# Patient Record
Sex: MALE | Race: WHITE | NOT HISPANIC OR LATINO
[De-identification: names, ages, dates, MRNs, and addresses within clinical notes are randomized per-mention and may not be internally consistent; named-entity substitution may affect disease eponyms.]

---
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
## 2022-07-26 ENCOUNTER — TELEPHONE (OUTPATIENT)
Dept: FAMILY MEDICINE | Facility: CLINIC | Age: 22
End: 2022-07-26

## 2022-07-26 NOTE — TELEPHONE ENCOUNTER
"Pt calling to reschedule appointment.   Notes to cancelled appointment states: \"lm to return call and reschedule appointment-provider has an emergency sugery\"  Got pt rescheduled August 16th but would like to be seen sooner if possible.   "

## 2022-07-27 NOTE — TELEPHONE ENCOUNTER
I have attempted to call the pt with the following message. I left a message for pt to call back. I will call back another time. Haylie Fiore, CMA

## 2022-07-27 NOTE — TELEPHONE ENCOUNTER
Please inform him that my schedule is full before the date of his appointment-he will have to wait for that date.  If he has concerns before that time he should consider urgent care. JAYA Pennington MD

## 2022-08-16 ENCOUNTER — OFFICE VISIT (OUTPATIENT)
Dept: FAMILY MEDICINE | Facility: CLINIC | Age: 22
End: 2022-08-16
Payer: COMMERCIAL

## 2022-08-16 DIAGNOSIS — Z11.4 SCREENING FOR HIV (HUMAN IMMUNODEFICIENCY VIRUS): ICD-10-CM

## 2022-08-16 DIAGNOSIS — Z11.59 NEED FOR HEPATITIS C SCREENING TEST: ICD-10-CM

## 2022-08-16 DIAGNOSIS — Z00.00 WELL ADULT EXAM: Primary | ICD-10-CM

## 2022-08-16 LAB
CHOLEST SERPL-MCNC: 141 MG/DL
FASTING STATUS PATIENT QL REPORTED: YES
HDLC SERPL-MCNC: 53 MG/DL
LDLC SERPL CALC-MCNC: 61 MG/DL
NONHDLC SERPL-MCNC: 88 MG/DL
TRIGL SERPL-MCNC: 136 MG/DL

## 2022-08-16 PROCEDURE — 90471 IMMUNIZATION ADMIN: CPT | Performed by: OBSTETRICS & GYNECOLOGY

## 2022-08-16 PROCEDURE — 90715 TDAP VACCINE 7 YRS/> IM: CPT | Performed by: OBSTETRICS & GYNECOLOGY

## 2022-08-16 PROCEDURE — 90472 IMMUNIZATION ADMIN EACH ADD: CPT | Performed by: OBSTETRICS & GYNECOLOGY

## 2022-08-16 PROCEDURE — 90651 9VHPV VACCINE 2/3 DOSE IM: CPT | Performed by: OBSTETRICS & GYNECOLOGY

## 2022-08-16 PROCEDURE — 36415 COLL VENOUS BLD VENIPUNCTURE: CPT | Performed by: OBSTETRICS & GYNECOLOGY

## 2022-08-16 PROCEDURE — 80061 LIPID PANEL: CPT | Performed by: OBSTETRICS & GYNECOLOGY

## 2022-08-16 PROCEDURE — 99385 PREV VISIT NEW AGE 18-39: CPT | Mod: 25 | Performed by: OBSTETRICS & GYNECOLOGY

## 2022-08-16 ASSESSMENT — ENCOUNTER SYMPTOMS
PALPITATIONS: 0
DIARRHEA: 0
HEMATURIA: 0
COUGH: 0
SHORTNESS OF BREATH: 0
WEAKNESS: 0
ABDOMINAL PAIN: 0
HEARTBURN: 0
CHILLS: 0
DIZZINESS: 0
EYE PAIN: 0
DYSURIA: 0
CONSTIPATION: 0
MYALGIAS: 0
HEMATOCHEZIA: 0
SORE THROAT: 0
NERVOUS/ANXIOUS: 1
JOINT SWELLING: 0
HEADACHES: 1
ARTHRALGIAS: 0
FEVER: 0
NAUSEA: 0
PARESTHESIAS: 0
FREQUENCY: 0

## 2022-08-16 NOTE — PROGRESS NOTES
ASSESSMENT/PLAN:                                                        1.  21-year-old male here for physical exam.  He is accompanied by his mother.  He has no concerns at this time.  We will check lipids since he is fasting.  We will give him Tdap and HPV today.  He is up-to-date on COVID vaccination.    2.  I advised him to see a dentist as part of his yearly maintenance and also to see an eye doctor.    3.  Follow-up in 1 year.                                                                      SUBJECTIVE:                                                       here for routine physical- no concerns.        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      Current Outpatient Medications   Medication Sig Dispense Refill     AMOXICILLIN 250 MG/5ML OR SUSR 1 tsp PO TID x 10days       IBUPROFEN 150-200mg every 6 hours as needed for pain/fever       TYLENOL CHILDRENS 160 MG/5ML OR SUSP 1 1/2 tsp every 4-6 hours as needed (or use two 120mg suppositories per rectum every 4-6 hours as needed for pain/fever/constipation)       ZOFRAN 4 MG OR TABS 1/2 tab every 6 hours as needed for nausea 3 0       ROS:  A 12 point systems review is negative except for what is listed above in the Subjective history.        Wt Readings from Last 3 Encounters:   21 48.2 kg (106 lb 3.2 oz) (4 %, Z= -1.79)*   08 16.3 kg (36 lb) (8 %, Z= -1.41)*   08 16.6 kg (36 lb 11.2 oz) (14 %, Z= -1.08)*     * Growth percentiles are based on CDC (Boys, 2-20 Years) data.                     BP Readings from Last 6  Encounters:   05/21/19 119/83   04/24/08 94/50 (50 %, Z = 0.00 /  32 %, Z = -0.47)*   01/26/07 (!) 82/48 (14 %, Z = -1.08 /  39 %, Z = -0.28)*   08/04/06 90/60 (40 %, Z = -0.25 /  86 %, Z = 1.08)*   02/11/05 90/68 (62 %, Z = 0.31 /  >99 %, Z >2.33)*     *BP percentiles are based on the 2017 AAP Clinical Practice Guideline for boys          OBJECTIVE:                                                    Vital signs: /60  P 68  R 15    HEENT is normal.      Neck is supple, mobile, no adenoapthy or masses palpable. Normal range of motion noted.     Chest is clear to auscultation. No wheezes, rales or rhonchi heard.  cardiac exam is normal with s1, s2, no murmurs or adventitious sounds.Normal rate and rhythm is heard.     Abdomen is soft,  nondistended, No masses felt.No HSM. No guarding or rigidity or rebound noted. Palpation reveals  no    tenderness   Normal bowel sounds heard.     Genital exam is normal.  He is circumcised.  There are no testicular masses felt.  No hernia.    Extremities without edema.  Pulses are physiologic.  Capillary refill is normal in fingers and feet.            Destin Pennington MD  [facility]       The above information was dictating using Dragon voice software and as a result there may be some irregularities that were not detected in my review of this note.                                                                        Answers for HPI/ROS submitted by the patient on 8/16/2022  Frequency of exercise:: 1 day/week  Getting at least 3 servings of Calcium per day:: Yes  Diet:: Regular (no restrictions)  Taking medications regularly:: Not Applicable  Bi-annual eye exam:: Yes  Dental care twice a year:: NO  Sleep apnea or symptoms of sleep apnea:: None  abdominal pain: No  Blood in stool: No  Blood in urine: No  chest pain: No  chills: No  congestion: No  constipation: No  cough: No  diarrhea: No  dizziness: No  ear pain: No  eye pain:  No  nervous/anxious: Yes  fever: No  frequency: No  genital sores: No  headaches: Yes  hearing loss: No  heartburn: No  arthralgias: No  joint swelling: No  peripheral edema: No  mood changes: No  myalgias: No  nausea: No  dysuria: No  palpitations: No  Skin sensation changes: No  sore throat: No  urgency: No  rash: No  shortness of breath: No  visual disturbance: No  weakness: No  impotence: No  penile discharge: No  Additional concerns today:: No  Duration of exercise:: 45-60 minutes

## 2022-11-11 ENCOUNTER — APPOINTMENT (OUTPATIENT)
Dept: GENERAL RADIOLOGY | Facility: CLINIC | Age: 22
End: 2022-11-11
Attending: EMERGENCY MEDICINE
Payer: COMMERCIAL

## 2022-11-11 ENCOUNTER — APPOINTMENT (OUTPATIENT)
Dept: CT IMAGING | Facility: CLINIC | Age: 22
End: 2022-11-11
Attending: EMERGENCY MEDICINE
Payer: COMMERCIAL

## 2022-11-11 ENCOUNTER — HOSPITAL ENCOUNTER (EMERGENCY)
Facility: CLINIC | Age: 22
Discharge: HOME | End: 2022-11-11
Attending: EMERGENCY MEDICINE | Admitting: EMERGENCY MEDICINE
Payer: COMMERCIAL

## 2022-11-11 VITALS
WEIGHT: 116.5 LBS | RESPIRATION RATE: 18 BRPM | SYSTOLIC BLOOD PRESSURE: 118 MMHG | DIASTOLIC BLOOD PRESSURE: 75 MMHG | TEMPERATURE: 98.5 F | OXYGEN SATURATION: 98 % | HEART RATE: 90 BPM

## 2022-11-11 DIAGNOSIS — S40.022A CONTUSION OF LEFT UPPER EXTREMITY, INITIAL ENCOUNTER: ICD-10-CM
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
## 2022-11-11 PROCEDURE — 72125 CT NECK SPINE W/O DYE: CPT

## 2022-11-11 PROCEDURE — 99285 EMERGENCY DEPT VISIT HI MDM: CPT | Mod: 25 | Performed by: EMERGENCY MEDICINE

## 2022-11-11 PROCEDURE — 70450 CT HEAD/BRAIN W/O DYE: CPT

## 2022-11-11 PROCEDURE — 99282 EMERGENCY DEPT VISIT SF MDM: CPT | Performed by: EMERGENCY MEDICINE

## 2022-11-11 PROCEDURE — 73060 X-RAY EXAM OF HUMERUS: CPT | Mod: LT

## 2022-11-12 NOTE — ED PROVIDER NOTES
History     Chief Complaint   Patient presents with     Fall     Head Injury     HPI  Bola Hurt is a 22 year old male who presents shortly after a fall down 8 carpeted stairs.  He reports losing his balance and tripping and rolling down the stairs.  His father reported he had a brief episode where his eyes rolled back and may be lost consciousness for 10 to 30 seconds.  He has a mild headache.  Moderate neck pain.  Some left upper arm pain.  No chest pain.  No abdominal pain.  No other injuries.    Allergies:  Allergies   Allergen Reactions     No Known Drug Allergies        Problem List:    Patient Active Problem List    Diagnosis Date Noted     Dyspnea and respiratory abnormality 2003     Priority: Medium     Problem list name updated by automated process. 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 No pertinent family history.    Social History:  Marital Status:  Single [1]  Social History     Tobacco Use     Smoking status: Never     Smokeless tobacco: Never   Substance Use Topics     Alcohol use: No     Drug use: No        Medications:    AMOXICILLIN 250 MG/5ML OR SUSR  IBUPROFEN  TYLENOL CHILDRENS 160 MG/5ML OR SUSP  ZOFRAN 4 MG OR TABS          Review of Systems  All other systems are reviewed and are negative    Physical Exam   BP: 125/86  Pulse: 69  Temp: 98.5  F (36.9  C)  Resp: 16  Weight: 52.8  kg (116 lb 8 oz)  SpO2: 98 %      Physical Exam  Constitutional:       General: He is not in acute distress.     Appearance: He is not diaphoretic.   HENT:      Head: Atraumatic.   Eyes:      Extraocular Movements: EOM normal.      Pupils: Pupils are equal, round, and reactive to light.   Cardiovascular:      Rate and Rhythm: Regular rhythm.      Heart sounds: Normal heart sounds.   Pulmonary:      Effort: No respiratory distress.      Breath sounds: Normal breath sounds.   Chest:      Chest wall: No tenderness.   Abdominal:      General: Bowel sounds are normal.      Palpations: Abdomen is soft.      Tenderness: There is no abdominal tenderness.   Musculoskeletal:      Cervical back: Tenderness present.      Thoracic back: No tenderness.      Lumbar back: No tenderness.      Comments: Left upper arm reveals some decreased range of motion through the shoulder joint.  No deformity, ecchymosis or significant swelling appreciated.  Normal range of motion to the elbow wrist.  Distal CMS intact.   Skin:     Findings: No abrasion or laceration.   Neurological:      Mental Status: He is alert and oriented to person, place, and time.         ED Course                 Procedures              Critical Care time:  none               Results for orders placed or performed during the hospital encounter of 11/11/22 (from the past 24 hour(s))   Cervical spine CT w/o contrast    Narrative    EXAM: CT CERVICAL SPINE W/O CONTRAST  LOCATION: [facility]  DATE/TIME: 11/11/2022 9:44 PM    INDICATION: trauma; Neck pain; Trauma; Significant trauma  COMPARISON: None.  TECHNIQUE: Routine CT Cervical Spine without IV contrast. Multiplanar reformats. Dose reduction techniques were used.    FINDINGS:  VERTEBRA: No acute fracture. No posttraumatic subluxation. Broad kyphosis spanning the cervical spine. Otherwise unremarkable alignment. No significant degenerative change.    CANAL/FORAMINA: No canal or neural  foraminal stenosis.    PARASPINAL: No extraspinal abnormality.      Impression    IMPRESSION:  1.  No fracture or posttraumatic subluxation.  2.  No high-grade spinal canal or neural foraminal stenosis.   CT Head w/o Contrast    Narrative    EXAM: CT HEAD W/O CONTRAST  LOCATION: [facility]  DATE/TIME: 11/11/2022 9:45 PM    INDICATION: trauma. Head trauma.  COMPARISON: None.  TECHNIQUE: Routine CT Head without IV contrast. Multiplanar reformats. Dose reduction techniques were used.    FINDINGS:  INTRACRANIAL CONTENTS: No intracranial hemorrhage, extraaxial collection, or mass effect.  No CT evidence of acute infarct. Normal parenchymal attenuation. Normal ventricles and sulci.     VISUALIZED ORBITS/SINUSES/MASTOIDS: No intraorbital abnormality. No paranasal sinus mucosal disease. No middle ear or mastoid effusion.    BONES/SOFT TISSUES: No skull fracture. No scalp hematoma.      Impression    IMPRESSION:  1.  Unremarkable head CT with no acute intracranial abnormality.   Humerus XR,  G/E 2 views, left    Narrative    EXAM: XR HUMERUS LEFT G/E 2 VIEWS  LOCATION: [facility]  DATE/TIME: 11/11/2022 9:47 PM    INDICATION: Traumatic injury.  COMPARISON: None.      Impression    IMPRESSION: Within normal limits. No fracture.       Medications - No data to display    Assessments & Plan (with Medical Decision Making)  22-year-old male with fall down multiple stairs and possible loss of consciousness briefly.  Head CT, cervical spine CT and x-ray of the left arm in region of pain negative for acute injury.  Stable for discharge home.     I have reviewed the nursing notes.    I have reviewed the findings, diagnosis, plan and need for follow up with the patient.       New Prescriptions    No medications on file       Final diagnoses:   Fall down stairs, initial encounter   Contusion of left upper extremity, initial encounter       11/11/2022   [facility]  [facility] EMERGENCY DEPT     Jonatan Lou MD  11/11/22 0489

## 2022-11-12 NOTE — ED TRIAGE NOTES
Around 1930 tonight PT fell down about 5 stairs he did hit his head and loss consciousness for about 30 seconds. PT also complains of L upper arm pain after the fall.

## 2023-07-17 ENCOUNTER — PATIENT OUTREACH (OUTPATIENT)
Dept: CARE COORDINATION | Facility: CLINIC | Age: 23
End: 2023-07-17
Payer: COMMERCIAL

## 2023-07-31 ENCOUNTER — PATIENT OUTREACH (OUTPATIENT)
Dept: CARE COORDINATION | Facility: CLINIC | Age: 23
End: 2023-07-31
Payer: COMMERCIAL